# Patient Record
(demographics unavailable — no encounter records)

---

## 2024-12-02 NOTE — DISCUSSION/SUMMARY
[FreeTextEntry1] : A 2-year-old female presents for a follow-up visit from her month trip to [place]. PE unremarkable. Immunizations UTD.   PLAN HCM - Lead to be done for positive lead screening with recent travel to [place] - RTC for 3-year-old HCM and prn   VIRAL URI WITH COUGH - Encouraged humidified air, nasal saline with suctioning every 4 hours as needed, and Zarbees with agave for alleviation of cough - Continue to encourage PO intake to fluids, continue to monitor for normal amounts of wet diapers - STRICT return precautions given, reviewed when to seek immediate medical attention including but not limited to return of fevers, inability to tolerate fluids by mouth, decreased urination, rapid or labored breathing or any other concerning sign or symptom  Caretaker expressed understanding of the plan and agrees. All questions were answered.

## 2024-12-02 NOTE — HISTORY OF PRESENT ILLNESS
[de-identified] : F/u from trip to [place]  [FreeTextEntry6] : A 2-year-old female presents for a follow-up visit from her trip to [facility]. Patient went to [facility] for 1 month and came back November 24th.  Parents state she has had a runny nose and cough. Parents state she is voiding, stooling, eating, and playing at baseline with no concerns. There are no breathing concerns. Dad denies any diarrhea, vomiting, fevers, JAYLON, and any other associated signs and symptoms. Her brother has similar symptoms.

## 2024-12-02 NOTE — REVIEW OF SYSTEMS
[Nasal Discharge] : nasal discharge [Nasal Congestion] : nasal congestion [Cough] : cough [Negative] : Genitourinary [Fever] : no fever [Irritable] : no irritability [Inconsolable] : consolable [Fussy] : not fussy [Crying] : no crying [Malaise] : no malaise [Difficulty with Sleep] : no difficulty with sleep [Eye Discharge] : no eye discharge [Eye Redness] : no eye redness [Snoring] : no snoring [Mouth Breathing] : no mouth breathing [Swollen Gums] : no swollen gums [Sore Throat] : no sore throat [Tachypnea] : not tachypneic [Wheezing] : no wheezing [Congestion] : no congestion

## 2024-12-02 NOTE — PHYSICAL EXAM
[Alert] : alert [Playful] : playful [Normocephalic] : normocephalic [Pink Nasal Mucosa] : pink nasal mucosa [Supple] : supple [FROM] : full passive range of motion [Clear to Auscultation Bilaterally] : clear to auscultation bilaterally [Regular Rate and Rhythm] : regular rate and rhythm [Normal S1, S2 audible] : normal S1, S2 audible [Soft] : soft [Normal Bowel Sounds] : normal bowel sounds [Moves All Extremities x 4] : moves all extremities x4 [Warm, Well Perfused x4] : warm, well perfused x4 [Capillary Refill <2s] : capillary refill < 2s [Warm] : warm [Clear] : clear [Acute Distress] : no acute distress [Erythematous Oropharynx] : nonerythematous oropharynx [Murmur] : no murmur [Tender] : nontender [Distended] : nondistended [Hepatosplenomegaly] : no hepatosplenomegaly [NL] : normotonic

## 2025-01-27 NOTE — HISTORY OF PRESENT ILLNESS
[whole ___ oz/d] : consumes [unfilled] oz of whole cow's milk per day [Sugar drinks] : sugar drinks [Fruit] : fruit [Meat] : meat [Grains] : grains [Dairy] : dairy [Vitamin] : Patient takes vitamin daily [___ stools per day] : [unfilled]  stools per day [___ voids per day] : [unfilled] voids per day [Normal] : Normal [In bed] : In bed [Wakes up at night] : Wakes up at night [Sippy cup use] : Sippy cup use [Brushing teeth] : Brushing teeth [Playtime (60 min/d)] : Playtime 60 min a day [Appropiate parent-child communication] : Appropriate parent-child communication [Child Cooperates] : Child cooperates [Parent has appropriate responses to behavior] : Parent has appropriate responses to behavior [No] : Not at  exposure [Water heater temperature set at <120 degrees F] : Water heater temperature set at <120 degrees F [Car seat in back seat] : Car seat in back seat [Smoke Detectors] : Smoke detectors [Supervised play near cars and streets] : Supervised play near cars and streets [Carbon Monoxide Detectors] : Carbon monoxide detectors [Up to date] : Up to date [NO] : No [Parents] : parents [Child given choices] : Child given choices [Exposure to electronic nicotine delivery system] : No exposure to electronic nicotine delivery system [FreeTextEntry7] : no acute events [de-identified] : none [de-identified] : poly-vi-sol [FreeTextEntry3] : to drink milk

## 2025-01-27 NOTE — PHYSICAL EXAM

## 2025-01-27 NOTE — DISCUSSION/SUMMARY
[Normal Growth] : growth [Normal Development] : development [None] : No known medical problems [No Elimination Concerns] : elimination [No Feeding Concerns] : feeding [No Skin Concerns] : skin [Normal Sleep Pattern] : sleep [No Medications] : ~He/She~ is not on any medications [Parent/Guardian] : parent/guardian [Family Support] : family support [Encouraging Literacy Activities] : encouraging literacy activities [Playing with Peers] : playing with peers [Promoting Physical Activity] : promoting physical activity [Safety] : safety [FreeTextEntry1] : 2y/o F presenting for [facility]. Growth and development appropriate. Vitals WNL for age. PE unremarkable except for lip cyst.  PLAN HCM - Referral: Dental, Ophtho - Labs: CBCd, Ferritin now. Lead in 2 more months. - Dental referral given  Continue balanced diet with all food groups. Brush teeth twice a day with toothbrush. Recommend visit to dentist. As per car seat 's guidelines, use forward-facing car seat in back seat of car. Switch to booster seat when child reaches highest weight/height for seat. Child needs to ride in a belt-positioning booster seat until  4 feet 9 inches has been reached and are between 8 and 12 years of age. Put toddler to sleep in own bed. Help toddler to maintain consistent daily routines and sleep schedule. Pre-K discussed. Ensure home is safe. Use consistent, positive discipline. Read aloud to toddler. Limit screen time to no more than 2 hours per day.  Caretaker expressed understanding of the plan and agreed. All of their questions were answered.

## 2025-01-27 NOTE — DEVELOPMENTAL MILESTONES
[Normal Development] : Normal Development [None] : none [Goes to the bathroom and urinates] : goes to bathroom and urinates by self [Plays and shares with others] : plays and shares with others [Eats independently] : eats independently [Uses 3-word sentences] : uses 3-word sentences [Uses words that are 75% intelligible] : uses words that are 75% intelligible to strangers [Understands simple prepositions] : understands simple prepositions [Tells a story from a book or TV] : tells a story from a book or TV [Pedals tricycle] : pedals tricycle [Climbs on and off couch] : climbs on and off couch or chair [Jumps forward] : jumps forward [Draws a single [place]] : draws a single [place] [Cuts with child scissor] : cuts with child scissor [Compares things using words such] : compares things using words such as bigger or shorter [Draws a person with head] : does not draw a person with head and one other body part